# Patient Record
Sex: FEMALE | Race: AMERICAN INDIAN OR ALASKA NATIVE | HISPANIC OR LATINO | Employment: FULL TIME | URBAN - METROPOLITAN AREA
[De-identification: names, ages, dates, MRNs, and addresses within clinical notes are randomized per-mention and may not be internally consistent; named-entity substitution may affect disease eponyms.]

---

## 2023-01-27 NOTE — PROGRESS NOTES
[address] [facility]    NAME: Lana Jaramillo  AGE: 36 y o  SEX: female  : 1980   DATE: 2023     Assessment and Plan:     Problem List Items Addressed This Visit        Digestive    Gastroesophageal reflux disease without esophagitis     Episodes of chest pain of burning and stabbing quality  Also reports  Unclear if pain is related to meals, but she does report epigastric abdominal pain and sour metallic taste in mouth  Karan's negative  - Advised food diary  - Pepcid PRN for 8 weeks  - Lipid panel pending, will consider EKG next visit  - f/u in 2 months         Relevant Medications    famotidine (PEPCID) 20 mg tablet   Other Visit Diagnoses     Annual physical exam    -  Primary    Relevant Orders    CBC and differential    Comprehensive metabolic panel    HEMOGLOBIN A1C W/ EAG ESTIMATION    TSH, 3rd generation with Free T4 reflex    Lipid Panel with Direct LDL reflex    Mammo screening bilateral w 3d & cad    Hepatitis C antibody    HIV 1/2 AG/AB W REFLEX [facility] and [facility] only    Encounter for immunization        Relevant Orders    influenza vaccine, quadrivalent, 0 5 mL, preservative-free, for adult and pediatric patients 6 mos+ (AFLURIA, FLUARIX, FLULAVAL, FLUZONE) (Completed)    TDAP VACCINE GREATER THAN OR EQUAL TO 8YO IM (Completed)    Encounter for screening mammogram for malignant neoplasm of breast        Relevant Orders    Mammo screening bilateral w 3d & cad        Counseling:  Alcohol/drug use: discussed moderation in alcohol intake, the recommendations for healthy alcohol use, and avoidance of illicit drug use  Dental Health: discussed importance of regular tooth brushing, flossing, and dental visits  Injury prevention: discussed safety/seat belts, safety helmets, smoke detectors, carbon dioxide detectors, and smoking near bedding or upholstery    Sexual health: discussed sexually transmitted diseases, partner selection, use of condoms, avoidance of unintended pregnancy, and contraceptive alternatives  · Exercise: the importance of regular exercise/physical activity was discussed  Recommend exercise 3-5 times per week for at least 30 minutes  · Immunizations and preventive care: screenings were discussed with patient today  Appropriate education was printed on patient's after visit summary  BMI Counseling: Body mass index is 30 79 kg/m²  The BMI is above normal  Nutrition recommendations include decreasing portion sizes, encouraging healthy choices of fruits and vegetables, decreasing fast food intake, consuming healthier snacks, limiting drinks that contain sugar, moderation in carbohydrate intake, increasing intake of lean protein, reducing intake of saturated and trans fat and reducing intake of cholesterol  Exercise recommendations include moderate physical activity 150 minutes/week  Rationale for BMI follow-up plan is due to patient being overweight or obese  Depression Screening and Follow-up Plan: Patient was screened for depression during today's encounter  They screened negative with a PHQ-2 score of 0  Return in about 8 weeks (around 3/27/2023) for Follow up chronic conditions  Chief Complaint:     Chief Complaint   Patient presents with   • Establish Care     Discuss dry mouth      History of Present Illness:     Adult Annual Physical   Patient here for a comprehensive physical exam  The patient reports problems - chest pain, metallic taste in mouth, dry mouth  Unclear if worsens with meals  Diet and Physical Activity  · Diet/Nutrition: consuming 3-5 servings of fruits/vegetables daily, adequate fiber intake and high carbohydrate diet  Recently decreased carbohydrate intake  · Exercise: Walking and 1-2 times a week on average        Depression Screening  PHQ-2/9 Depression Screening    Little interest or pleasure in doing things: 0 - not at all  Feeling down, depressed, or hopeless: 0 - not at all  Trouble falling or staying asleep, or sleeping too much: 0 - not at all  Feeling tired or having little energy: 2 - more than half the days  Poor appetite or overeatin - not at all  Feeling bad about yourself - or that you are a failure or have let yourself or your family down: 0 - not at all  Trouble concentrating on things, such as reading the newspaper or watching television: 0 - not at all  Moving or speaking so slowly that other people could have noticed  Or the opposite - being so fidgety or restless that you have been moving around a lot more than usual: 0 - not at all  Thoughts that you would be better off dead, or of hurting yourself in some way: 0 - not at all  PHQ-2 Score: 0  PHQ-2 Interpretation: Negative depression screen  PHQ-9 Score: 2   PHQ-9 Interpretation: No or Minimal depression        General Health  · Sleep: sleeps well and gets 7-8 hours of sleep on average  · Hearing: normal - bilateral   · Vision: most recent eye exam <1 year ago and wears glasses  · Dental: regular dental visits  /GYN Health  · Patient is: premenopausal  · Contraceptive method: none     Review of Systems:     Review of Systems   Constitutional: Negative for chills and fever  HENT: Negative for ear pain and sore throat  Eyes: Negative for pain and visual disturbance  Respiratory: Negative for cough and shortness of breath  Cardiovascular: Negative for palpitations  Chest pain: 1 episode of chest pain  Gastrointestinal: Negative for abdominal pain and vomiting  Genitourinary: Negative for dysuria and hematuria  Musculoskeletal: Positive for back pain  Skin: Negative for color change and rash  Neurological: Negative for seizures and syncope  Past Medical History:     History reviewed  No pertinent past medical history  Past Surgical History:     History reviewed  No pertinent surgical history     Social History:     Social History     Socioeconomic History   • Marital status: Single     Spouse name: None   • Number of children: None   • Years of education: None   • Highest education level: None   Occupational History   • None   Tobacco Use   • Smoking status: Never   • Smokeless tobacco: Never   Substance and Sexual Activity   • Alcohol use: Not Currently   • Drug use: Never   • Sexual activity: Yes     Partners: Male   Other Topics Concern   • None   Social History Narrative   • None     Social Determinants of Health     Financial Resource Strain: Not on file   Food Insecurity: Not on file   Transportation Needs: Not on file   Physical Activity: Not on file   Stress: Not on file   Social Connections: Not on file   Intimate Partner Violence: Not on file   Housing Stability: Not on file      Family History:     History reviewed  No pertinent family history  Current Medications:     Current Outpatient Medications   Medication Sig Dispense Refill   • famotidine (PEPCID) 20 mg tablet Take 1 tablet (20 mg total) by mouth 2 (two) times a day as needed for heartburn 30 tablet 1   • Calcium-Vitamin D 500-3  125 MG-MCG TABS Take by mouth     • Multiple Vitamin (Multi-Vitamin Daily) TABS Take by mouth       No current facility-administered medications for this visit  Allergies:     No Known Allergies   Physical Exam:     /53 (BP Location: Left arm, Patient Position: Sitting, Cuff Size: Standard)   Pulse 60   Temp (!) 96 3 °F (35 7 °C)   Resp 16   Ht 5' 4 96" (1 65 m)   Wt 83 8 kg (184 lb 12 8 oz)   LMP 01/28/2023 (Exact Date)   SpO2 100%   BMI 30 79 kg/m²     Physical Exam  Vitals and nursing note reviewed  Constitutional:       General: She is not in acute distress  Appearance: Normal appearance  She is well-developed  She is obese  HENT:      Head: Normocephalic and atraumatic  Right Ear: Tympanic membrane, ear canal and external ear normal  There is no impacted cerumen        Left Ear: Tympanic membrane, ear canal and external ear normal  There is no impacted cerumen  Nose: No congestion  Mouth/Throat:      Mouth: Mucous membranes are moist       Pharynx: Oropharynx is clear  Eyes:      Conjunctiva/sclera: Conjunctivae normal    Cardiovascular:      Rate and Rhythm: Normal rate and regular rhythm  Heart sounds: No murmur heard  Pulmonary:      Effort: Pulmonary effort is normal  No respiratory distress  Breath sounds: Normal breath sounds  Abdominal:      Palpations: Abdomen is soft  Tenderness: There is no abdominal tenderness  Musculoskeletal:      Cervical back: Neck supple  Right lower leg: No edema  Left lower leg: No edema  Skin:     General: Skin is warm and dry  Capillary Refill: Capillary refill takes 2 to 3 seconds  Neurological:      Mental Status: She is alert     Psychiatric:         Mood and Affect: Mood normal           Zaki Goodman MD  [address] [place] [address]

## 2023-01-30 ENCOUNTER — OFFICE VISIT (OUTPATIENT)
Dept: FAMILY MEDICINE CLINIC | Facility: CLINIC | Age: 43
End: 2023-01-30

## 2023-01-30 VITALS
HEART RATE: 60 BPM | HEIGHT: 65 IN | RESPIRATION RATE: 16 BRPM | DIASTOLIC BLOOD PRESSURE: 53 MMHG | BODY MASS INDEX: 30.79 KG/M2 | TEMPERATURE: 96.3 F | WEIGHT: 184.8 LBS | OXYGEN SATURATION: 100 % | SYSTOLIC BLOOD PRESSURE: 113 MMHG

## 2023-01-30 DIAGNOSIS — Z23 ENCOUNTER FOR IMMUNIZATION: ICD-10-CM

## 2023-01-30 DIAGNOSIS — Z12.31 ENCOUNTER FOR SCREENING MAMMOGRAM FOR MALIGNANT NEOPLASM OF BREAST: ICD-10-CM

## 2023-01-30 DIAGNOSIS — Z00.00 ANNUAL PHYSICAL EXAM: Primary | ICD-10-CM

## 2023-01-30 DIAGNOSIS — K21.9 GASTROESOPHAGEAL REFLUX DISEASE WITHOUT ESOPHAGITIS: ICD-10-CM

## 2023-01-30 RX ORDER — FAMOTIDINE 20 MG/1
20 TABLET, FILM COATED ORAL 2 TIMES DAILY PRN
Qty: 30 TABLET | Refills: 1 | Status: SHIPPED | OUTPATIENT
Start: 2023-01-30

## 2023-01-30 RX ORDER — MULTIVITAMIN
TABLET ORAL
COMMUNITY

## 2023-01-31 PROBLEM — K21.9 GASTROESOPHAGEAL REFLUX DISEASE WITHOUT ESOPHAGITIS: Status: ACTIVE | Noted: 2023-01-31

## 2023-01-31 NOTE — ASSESSMENT & PLAN NOTE
Episodes of chest pain of burning and stabbing quality  Also reports  Unclear if pain is related to meals, but she does report epigastric abdominal pain and sour metallic taste in mouth  Karan's negative    - Advised food diary  - Pepcid PRN for 8 weeks  - Lipid panel pending, will consider EKG next visit  - f/u in 2 months

## 2023-02-03 LAB
ALBUMIN SERPL-MCNC: 4.1 G/DL (ref 3.8–4.8)
ALBUMIN/GLOB SERPL: 1.6 {RATIO} (ref 1.2–2.2)
ALP SERPL-CCNC: 86 IU/L (ref 44–121)
ALT SERPL-CCNC: 22 IU/L (ref 0–32)
AST SERPL-CCNC: 22 IU/L (ref 0–40)
BASOPHILS # BLD AUTO: 0.1 X10E3/UL (ref 0–0.2)
BASOPHILS NFR BLD AUTO: 1 %
BILIRUB SERPL-MCNC: 0.5 MG/DL (ref 0–1.2)
BUN SERPL-MCNC: 13 MG/DL (ref 6–24)
BUN/CREAT SERPL: 22 (ref 9–23)
CALCIUM SERPL-MCNC: 9.2 MG/DL (ref 8.7–10.2)
CHLORIDE SERPL-SCNC: 103 MMOL/L (ref 96–106)
CHOLEST SERPL-MCNC: 181 MG/DL (ref 100–199)
CO2 SERPL-SCNC: 23 MMOL/L (ref 20–29)
CREAT SERPL-MCNC: 0.59 MG/DL (ref 0.57–1)
EGFR: 115 ML/MIN/1.73
EOSINOPHIL # BLD AUTO: 0.1 X10E3/UL (ref 0–0.4)
EOSINOPHIL NFR BLD AUTO: 3 %
ERYTHROCYTE [DISTWIDTH] IN BLOOD BY AUTOMATED COUNT: 14.2 % (ref 11.7–15.4)
EST. AVERAGE GLUCOSE BLD GHB EST-MCNC: 108 MG/DL
GLOBULIN SER-MCNC: 2.5 G/DL (ref 1.5–4.5)
GLUCOSE SERPL-MCNC: 83 MG/DL (ref 70–99)
HBA1C MFR BLD: 5.4 % (ref 4.8–5.6)
HCT VFR BLD AUTO: 33.8 % (ref 34–46.6)
HDLC SERPL-MCNC: 52 MG/DL
HGB BLD-MCNC: 10.9 G/DL (ref 11.1–15.9)
IMM GRANULOCYTES # BLD: 0 X10E3/UL (ref 0–0.1)
IMM GRANULOCYTES NFR BLD: 0 %
LDLC SERPL CALC-MCNC: 112 MG/DL (ref 0–99)
LYMPHOCYTES # BLD AUTO: 2 X10E3/UL (ref 0.7–3.1)
LYMPHOCYTES NFR BLD AUTO: 40 %
MCH RBC QN AUTO: 25.7 PG (ref 26.6–33)
MCHC RBC AUTO-ENTMCNC: 32.2 G/DL (ref 31.5–35.7)
MCV RBC AUTO: 80 FL (ref 79–97)
MONOCYTES # BLD AUTO: 0.5 X10E3/UL (ref 0.1–0.9)
MONOCYTES NFR BLD AUTO: 10 %
NEUTROPHILS # BLD AUTO: 2.4 X10E3/UL (ref 1.4–7)
NEUTROPHILS NFR BLD AUTO: 46 %
PLATELET # BLD AUTO: 256 X10E3/UL (ref 150–450)
POTASSIUM SERPL-SCNC: 4.2 MMOL/L (ref 3.5–5.2)
PROT SERPL-MCNC: 6.6 G/DL (ref 6–8.5)
RBC # BLD AUTO: 4.24 X10E6/UL (ref 3.77–5.28)
SODIUM SERPL-SCNC: 139 MMOL/L (ref 134–144)
TRIGL SERPL-MCNC: 91 MG/DL (ref 0–149)
TSH SERPL DL<=0.005 MIU/L-ACNC: 2.61 UIU/ML (ref 0.45–4.5)
WBC # BLD AUTO: 5.2 X10E3/UL (ref 3.4–10.8)

## 2023-03-30 ENCOUNTER — RA CDI HCC (OUTPATIENT)
Dept: OTHER | Facility: HOSPITAL | Age: 43
End: 2023-03-30

## 2023-03-30 NOTE — PROGRESS NOTES
[facility]  coding opportunities       Chart reviewed, no opportunity found: CHART REVIEWED, NO OPPORTUNITY FOUND        Patients Insurance        Commercial Insurance: Mario Moore

## 2023-04-30 LAB — HCV AB S/CO SERPL IA: NON REACTIVE

## 2023-06-26 ENCOUNTER — OFFICE VISIT (OUTPATIENT)
Dept: FAMILY MEDICINE CLINIC | Facility: CLINIC | Age: 43
End: 2023-06-26
Payer: COMMERCIAL

## 2023-06-26 VITALS
SYSTOLIC BLOOD PRESSURE: 106 MMHG | TEMPERATURE: 98.2 F | BODY MASS INDEX: 31.06 KG/M2 | HEART RATE: 78 BPM | RESPIRATION RATE: 16 BRPM | WEIGHT: 186.4 LBS | OXYGEN SATURATION: 97 % | DIASTOLIC BLOOD PRESSURE: 54 MMHG | HEIGHT: 65 IN

## 2023-06-26 DIAGNOSIS — D64.9 ANEMIA, UNSPECIFIED TYPE: ICD-10-CM

## 2023-06-26 DIAGNOSIS — K21.9 GASTROESOPHAGEAL REFLUX DISEASE WITHOUT ESOPHAGITIS: Primary | ICD-10-CM

## 2023-06-26 DIAGNOSIS — G62.9 NEUROPATHY: ICD-10-CM

## 2023-06-26 DIAGNOSIS — M25.50 POLYARTHRALGIA: ICD-10-CM

## 2023-06-26 PROCEDURE — 99214 OFFICE O/P EST MOD 30 MIN: CPT | Performed by: FAMILY MEDICINE

## 2023-06-26 RX ORDER — PANTOPRAZOLE SODIUM 40 MG/1
40 TABLET, DELAYED RELEASE ORAL DAILY
Qty: 90 TABLET | Refills: 0 | Status: SHIPPED | OUTPATIENT
Start: 2023-06-26

## 2023-06-26 NOTE — PATIENT INSTRUCTIONS
Dieta mediterránea   CUIDADO AMBULATORIO:   Lisa Donning mediterránea es un plan de comidas que incluye alimentos que se consumen comúnmente en los países que bordean el [facility]  Selina plan de comidas puede proporcionar varios beneficios para [employer]  Se trata de perder o mantener el peso y disminuyendo la presión arterial, el azúcar en la eric y los niveles de [place]  También puede ayudar a proteger contra ciertas afecciones tales júnior enfermedad cardíaca, cáncer, diabetes de tipo 2 y enfermedad de Alzheimer  Colabore con un dietista para desarrollar un plan de alimentación que sea adecuado para usted  Alimentos para incluir en la dieta mediterránea: Sherleen Brittany y verduras en cada comida  Consuma norman variedad de frutas frescas y vegetales  Elija cereales integrales todos los días  Estos alimentos incluyen panes integrales, pastas y cereales  También incluye arroz integral, quinoa y mijo  Utilice las grasas insaturadas en lugar de grasas saturadas  Cocine con aceite de tsai o de canola  Limite el consumo de grasas saturadas, júnior [place], Erick y [facility]  Las grasas saturadas son grasas poco saludables que pueden aumentar asad niveles de [place]  Elija alimentos vegetales, aves y pescado júnior bean principales de proteínas  Coma alimentos vegetales que proporcionan proteínas, júnior lentejas, frijoles, garbanzos, nueces y semillas  Elija principalmente alimentos vegetales en lugar de carne en la mayoría de los días de la [place]  Coma alimentos ricos en grasas omega-3  Los pescados ricos en grasas omega-3 son el salmón, la [place] y el atún  Incluya estos tipos de pescado 1 o 2 veces por semana  Límite el pescado alto en fer, júnior el tiburón, el pez [place], el [place] y la caballa  Grasas omega-3 también se encuentran en las nueces y la linaza  Jc Stake de velazco (Grace Jose Luis) sin piel en lugar de donnell Luna  La carne juan m es shavonne en grasas saturadas  Limite el consumo de huevos y de amalia ricas en grasa, tales júnior tocino, chorizo y salchichas  Elija productos lácteos bajos en grasa tales júnior [address], o [place] al 1%, o leches de [place], de anacardo o de soja bajas en grasa  Otros ejemplos [address], yogur y requesón con un bajo contenido de grasa  Limite los dulces  Limite la ingesta de alimentos ricos en azúcar, júnior refrescos, postres y golosinas  Consulte con gavin médico sobre el alcohol  Los estudios gamez demostrado que la ingesta moderada de vino puede reducir el riesgo de enfermedades del corazón  Norman cantidad moderada de vino es 1 porción para las mujeres y los hombres de 72 años cada día  Se recomiendan dos porciones para hombres de 21 a 59 años de edad cada día  Norman porción de vino es de 5 onzas  Otras cosas que debe saber si sigue la dieta mediterránea:  Consuma alimentos con un alto contenido de joe y de vitamina C  Los alimentos vegetales ricos en joe incluyen espinaca, habas, tofu y alcachofa  Consuma norman porción de vitamina C con un alimento rico en joe para ayudar a gavin cuerpo a absorber más joe  Los ejemplos incluyen naranjas, fresas, melón, brócoli y [employer]  Realice actividad física regularmente  La dieta mediterránea tendrá [employer] beneficios si realiza actividad física regularmente  Qasim 30 minutos de [place] al menos 5 eusebio a la semana  Elija actividades físicas que aumentan la frecuencia cardíaca  Los ejemplos incluyen caminar, hacer senderismo, nadar y andar en bicicleta  Pregunte a gavin médico acerca del mejor plan de ejercicio para usted  © Copyright Shadi [facility] 2022 Information is for End User's use only and may not be sold, redistributed or otherwise used for commercial purposes  Esta información es sólo para uso en educación  Gavin intención no es darle un consejo médico sobre enfermedades o tratamientos   Colsulte con gavin médico, enfermera o farmacéutico antes de seguir cualquier régimen médico para saber si es seguro y efectivo para usted

## 2023-06-26 NOTE — PROGRESS NOTES
Name: Amada Reyes      : 1980      MRN: [id number]  Encounter Provider: Andrei Morse MD  Encounter Date: 2023   Encounter department: [facility]     1  Gastroesophageal reflux disease without esophagitis  Assessment & Plan:  Patient's episodes of chest discomfort and halitosis improved with Pepcid  Symptoms returned once patient discontinued the medication  · Discussed starting a PPI and patient was agreeable  · We will trial the medication for 8 to 12 weeks, if symptoms recur after regimen with Protonix will test for H  pylori  Orders:  -     pantoprazole (PROTONIX) 40 mg tablet; Take 1 tablet (40 mg total) by mouth daily    2  Polyarthralgia  Assessment & Plan:  Knees, wrists and shoulders affected  She also reports neuropathic type pain  · Follow up labs      Orders:  -     Iron Panel (Includes Ferritin, Iron Sat%, Iron, and TIBC); Future  -     Vitamin B12; Future  -     Sedimentation rate, automated; Future  -     PINKY Screen w/ Reflex to Titer/Pattern; Future    3  Neuropathy  Comments:  Hand and stocking distribution, A1c negative, not on any medications that could cause this  Follow-up vitamin panel and inflammatory markers  Orders:  -     Iron Panel (Includes Ferritin, Iron Sat%, Iron, and TIBC); Future  -     Vitamin B12; Future  -     Sedimentation rate, automated; Future  -     PINKY Screen w/ Reflex to Titer/Pattern; Future    4  Anemia, unspecified type  Comments:  Further work-up will determine need for supplementation  We will follow-up  Orders:  -     Iron Panel (Includes Ferritin, Iron Sat%, Iron, and TIBC); Future            Subjective      HPI     Patient presenting for f/u GERD and to discuss lab results  Was taking Pepcid as prescribed and stopped taking the medication because she felt better from her symptoms  After stopping the medication her reflux returned   She also has halitosis which is very frustrating for "her     She is also reporting joint pain and burning feeling in her hands and feet  It is accompanied by numbness, and worse in the upper extremities  All labs were discussed, including finding of low hemoglobin  Review of Systems   Constitutional: Negative for chills and fever  Eyes: Negative for visual disturbance  Respiratory: Negative for cough and shortness of breath  Cardiovascular: Negative for chest pain and palpitations  Gastrointestinal: Negative for constipation, diarrhea and vomiting  Abdominal pain: Epigastric discomfort  Genitourinary: Negative for dysuria and hematuria  Musculoskeletal: Positive for arthralgias and myalgias  Negative for back pain  Skin: Negative for rash  Neurological: Negative for seizures and syncope  Current Outpatient Medications on File Prior to Visit   Medication Sig   • Calcium-Vitamin D 500-3  125 MG-MCG TABS Take by mouth   • Multiple Vitamin (Multi-Vitamin Daily) TABS Take by mouth       Objective     /54   Pulse 78   Temp 98 2 °F (36 8 °C)   Resp 16   Ht 5' 5\" (1 651 m)   Wt 84 6 kg (186 lb 6 4 oz)   SpO2 97%   BMI 31 02 kg/m²     Physical Exam  Vitals reviewed  Constitutional:       General: She is not in acute distress  Appearance: Normal appearance  She is obese  Cardiovascular:      Rate and Rhythm: Normal rate and regular rhythm  Heart sounds: No murmur heard  Pulmonary:      Effort: Pulmonary effort is normal  No respiratory distress  Breath sounds: Normal breath sounds  Abdominal:      General: Bowel sounds are normal       Palpations: Abdomen is soft  Tenderness: There is no abdominal tenderness  Musculoskeletal:         General: No swelling or deformity  Cervical back: Neck supple  Neurological:      Mental Status: She is alert            Nereida Dumont MD  "

## 2023-06-29 PROBLEM — M25.50 POLYARTHRALGIA: Status: ACTIVE | Noted: 2023-06-29

## 2023-06-29 NOTE — ASSESSMENT & PLAN NOTE
Patient's episodes of chest discomfort and halitosis improved with Pepcid  Symptoms returned once patient discontinued the medication  · Discussed starting a PPI and patient was agreeable  · We will trial the medication for 8 to 12 weeks, if symptoms recur after regimen with Protonix will test for H  pylori

## 2023-07-15 LAB
ANA TITR SER IF: NEGATIVE {TITER}
ERYTHROCYTE [SEDIMENTATION RATE] IN BLOOD BY WESTERGREN METHOD: 17 MM/HR (ref 0–32)
FERRITIN SERPL-MCNC: 6 NG/ML (ref 15–150)
IRON SATN MFR SERPL: 8 % (ref 15–55)
IRON SERPL-MCNC: 36 UG/DL (ref 27–159)
TIBC SERPL-MCNC: 433 UG/DL (ref 250–450)
UIBC SERPL-MCNC: 397 UG/DL (ref 131–425)
VIT B12 SERPL-MCNC: 472 PG/ML (ref 232–1245)

## 2023-07-19 ENCOUNTER — TELEPHONE (OUTPATIENT)
Dept: FAMILY MEDICINE CLINIC | Facility: CLINIC | Age: 43
End: 2023-07-19

## 2023-07-19 NOTE — TELEPHONE ENCOUNTER
vm on [place] appt line:     Buenas tardes, mi nombre es Cristina Cease y yo tengo un appointment para Wilmer Montero. 20 a las 10:40 H para mi hijo [address] Ashish. Lo tengo que cancelar porque. Tengo. ¿¿Que trabajas entonces? . Tori Been pedirle que si me pueden hacer otra [place]. Para la semana que viene estaría [address], [place].

## 2023-07-20 NOTE — TELEPHONE ENCOUNTER
If patient call please staff let her know or transfer to me that she has and apt for her self on 7/25/2023 at 3pm thanks

## 2023-07-25 ENCOUNTER — OFFICE VISIT (OUTPATIENT)
Dept: FAMILY MEDICINE CLINIC | Facility: CLINIC | Age: 43
End: 2023-07-25
Payer: COMMERCIAL

## 2023-07-25 VITALS
OXYGEN SATURATION: 98 % | WEIGHT: 188 LBS | HEART RATE: 76 BPM | RESPIRATION RATE: 18 BRPM | DIASTOLIC BLOOD PRESSURE: 53 MMHG | BODY MASS INDEX: 31.28 KG/M2 | SYSTOLIC BLOOD PRESSURE: 112 MMHG

## 2023-07-25 DIAGNOSIS — G62.9 NEUROPATHY: ICD-10-CM

## 2023-07-25 DIAGNOSIS — D50.9 IRON DEFICIENCY ANEMIA, UNSPECIFIED IRON DEFICIENCY ANEMIA TYPE: Primary | ICD-10-CM

## 2023-07-25 PROCEDURE — 99213 OFFICE O/P EST LOW 20 MIN: CPT | Performed by: FAMILY MEDICINE

## 2023-07-25 RX ORDER — ASCORBIC ACID 500 MG
500 TABLET ORAL DAILY
Qty: 90 TABLET | Refills: 1 | Status: SHIPPED | OUTPATIENT
Start: 2023-07-25

## 2023-07-25 RX ORDER — GABAPENTIN 100 MG/1
100 CAPSULE ORAL
Qty: 30 CAPSULE | Refills: 2 | Status: SHIPPED | OUTPATIENT
Start: 2023-07-25

## 2023-07-25 RX ORDER — FERROUS SULFATE TAB EC 324 MG (65 MG FE EQUIVALENT) 324 (65 FE) MG
324 TABLET DELAYED RESPONSE ORAL
Qty: 90 TABLET | Refills: 3 | Status: SHIPPED | OUTPATIENT
Start: 2023-07-25

## 2023-07-30 PROBLEM — D50.9 IRON DEFICIENCY ANEMIA: Status: ACTIVE | Noted: 2023-07-30

## 2023-07-30 PROBLEM — G62.9 NEUROPATHY: Status: ACTIVE | Noted: 2023-07-30

## 2023-07-30 NOTE — PROGRESS NOTES
Name: Dallin Cuellar      : 1980      MRN: [id number]  Encounter Provider: Magnolia Ruff MD  Encounter Date: 2023   Encounter department: [address]     1. Iron deficiency anemia, unspecified iron deficiency anemia type  Assessment & Plan:  Work-up consistent with KATE. Iron supplementation and vitamin C prescribed. We will continue to monitor patient's anemia. Orders:  -     ferrous sulfate 324 (65 Fe) mg; Take 1 tablet (324 mg total) by mouth daily before breakfast  -     ascorbic acid (VITAMIN C) 500 MG tablet; Take 1 tablet (500 mg total) by mouth daily    2. Neuropathy  Assessment & Plan:  Patient with bilateral neuropathy affecting palms and soles. Initial work-up did not reveal any possible causes. Pain has been disturbing patient's sleep  · At this point, neurology referral is appropriate  · We will prescribe low-dose gabapentin to help assist with symptoms until neurology appointment    Orders:  -     gabapentin (Neurontin) 100 mg capsule; Take 1 capsule (100 mg total) by mouth daily at bedtime  -     Ambulatory Referral to Neurology; Future         Subjective      HPI     Marylen Snow is presenting to discuss lab results. Patient was being worked up for anemia and neuropathic symptoms. She describes symptoms of burning on palms and soles bilaterally. It is accompanied by numbness, and worse in the upper extremities. Symptoms occur all day and are worse at night. Symptoms are symmetric. She denies orthostasis, nausea, alternating diarrhea and constipation, dry eyes and dry mouth and blurred vision. No previous treatment. Review of Systems   Constitutional: Negative for chills and fever. Respiratory: Negative for cough and shortness of breath. Cardiovascular: Negative for chest pain. Gastrointestinal: Negative for abdominal pain. Skin: Negative for color change and rash. Neurological: Negative for syncope and weakness.        Current Outpatient Medications on File Prior to Visit   Medication Sig   • Calcium-Vitamin D 500-3. 125 MG-MCG TABS Take by mouth   • Multiple Vitamin (Multi-Vitamin Daily) TABS Take by mouth   • pantoprazole (PROTONIX) 40 mg tablet Take 1 tablet (40 mg total) by mouth daily       Objective     /53   Pulse 76   Resp 18   Wt 85.3 kg (188 lb)   SpO2 98%   BMI 31.28 kg/m²     Physical Exam  Vitals reviewed. Constitutional:       General: She is not in acute distress. Appearance: Normal appearance. She is obese. Cardiovascular:      Rate and Rhythm: Normal rate and regular rhythm. Heart sounds: No murmur heard. Pulmonary:      Effort: Pulmonary effort is normal. No respiratory distress. Breath sounds: Normal breath sounds. Abdominal:      General: Bowel sounds are normal.      Palpations: Abdomen is soft. Tenderness: There is no abdominal tenderness. Musculoskeletal:         General: No swelling or deformity. Right hand: Normal range of motion. Normal sensation. Normal capillary refill. Normal pulse. Left hand: Normal range of motion. Normal sensation. Normal capillary refill. Normal pulse. Cervical back: Neck supple. Right foot: Normal range of motion and normal capillary refill. Normal pulse. Left foot: Normal range of motion and normal capillary refill. Normal pulse. Comments: Microfilament testing on bilateral hands and feet wnl   Neurological:      Mental Status: She is alert.        Loi Jack MD

## 2023-09-24 DIAGNOSIS — K21.9 GASTROESOPHAGEAL REFLUX DISEASE WITHOUT ESOPHAGITIS: ICD-10-CM

## 2023-09-25 RX ORDER — PANTOPRAZOLE SODIUM 40 MG/1
40 TABLET, DELAYED RELEASE ORAL DAILY
Qty: 90 TABLET | Refills: 0 | Status: SHIPPED | OUTPATIENT
Start: 2023-09-25

## 2023-12-24 DIAGNOSIS — K21.9 GASTROESOPHAGEAL REFLUX DISEASE WITHOUT ESOPHAGITIS: ICD-10-CM

## 2023-12-26 RX ORDER — PANTOPRAZOLE SODIUM 40 MG/1
40 TABLET, DELAYED RELEASE ORAL DAILY
Qty: 90 TABLET | Refills: 0 | Status: SHIPPED | OUTPATIENT
Start: 2023-12-26

## 2024-03-28 ENCOUNTER — OFFICE VISIT (OUTPATIENT)
Age: 44
End: 2024-03-28

## 2024-03-28 VITALS
DIASTOLIC BLOOD PRESSURE: 60 MMHG | SYSTOLIC BLOOD PRESSURE: 96 MMHG | HEART RATE: 76 BPM | TEMPERATURE: 97.9 F | HEIGHT: 65 IN | WEIGHT: 189 LBS | OXYGEN SATURATION: 97 % | BODY MASS INDEX: 31.49 KG/M2 | RESPIRATION RATE: 18 BRPM

## 2024-03-28 DIAGNOSIS — Z00.00 ANNUAL PHYSICAL EXAM: Primary | ICD-10-CM

## 2024-03-28 DIAGNOSIS — R30.0 DYSURIA: ICD-10-CM

## 2024-03-28 DIAGNOSIS — R19.6 HALITOSIS: ICD-10-CM

## 2024-03-28 DIAGNOSIS — D50.9 IRON DEFICIENCY ANEMIA, UNSPECIFIED IRON DEFICIENCY ANEMIA TYPE: ICD-10-CM

## 2024-03-28 DIAGNOSIS — K21.9 GASTROESOPHAGEAL REFLUX DISEASE WITHOUT ESOPHAGITIS: ICD-10-CM

## 2024-03-28 DIAGNOSIS — Z12.31 ENCOUNTER FOR SCREENING MAMMOGRAM FOR BREAST CANCER: ICD-10-CM

## 2024-03-28 PROCEDURE — 99396 PREV VISIT EST AGE 40-64: CPT | Performed by: FAMILY MEDICINE

## 2024-03-28 RX ORDER — CHLORHEXIDINE GLUCONATE ORAL RINSE 1.2 MG/ML
15 SOLUTION DENTAL DAILY
Qty: 105 ML | Refills: 0 | Status: SHIPPED | OUTPATIENT
Start: 2024-03-28 | End: 2024-04-04

## 2024-03-28 NOTE — ASSESSMENT & PLAN NOTE
Send labs to recheck levels.  Advised patient to continue iron supplementation.  GI referral sent to rule out GI bleed as cause of anemia.

## 2024-03-28 NOTE — ASSESSMENT & PLAN NOTE
Patient with sensation of a bulge in the vagina during activity, also reported a burning sensation.  Patient has had 3 vaginal deliveries.  Consider pelvic organ prolapse  Will send UA to rule out urinary infection  RTO for GYN annual, will evaluate POP at that time

## 2024-03-28 NOTE — PROGRESS NOTES
ADULT ANNUAL PHYSICAL  [facility] PRACTICE    NAME: Sejal Venegas  AGE: 43 y.o. SEX: female : 1980   DATE: 3/28/2024     Assessment and Plan:     1. Annual physical exam  -     CBC and differential; Future  -     Comprehensive metabolic panel; Future  -     TSH, 3rd generation with Free T4 reflex; Future  -     Lipid panel; Future  -     CBC and differential  -     Comprehensive metabolic panel  -     TSH, 3rd generation with Free T4 reflex  -     Lipid panel    2. Encounter for screening mammogram for breast cancer  -     Mammo screening bilateral w 3d & cad; Future; Expected date: 2024    3. Gastroesophageal reflux disease without esophagitis  Assessment & Plan:  GERD symptoms improved with Protonix, however they came back after medication stopped patient also experiencing dyspepsia symptoms despite PPI therapy.  Will check for H. pylori  Patient also has iron deficiency anemia, will send referral to GI due to persistent symptoms and anemia.    Patient 43 years old and almost due for colonoscopy    Orders:  -     H. pylori antigen, stool; Future  -     H. pylori antigen, stool  -     Ambulatory Referral to Gastroenterology; Future    4. Iron deficiency anemia, unspecified iron deficiency anemia type  Assessment & Plan:  Send labs to recheck levels.  Advised patient to continue iron supplementation.  GI referral sent to rule out GI bleed as cause of anemia.    Orders:  -     Ambulatory Referral to Gastroenterology; Future  -     Iron Panel (Includes Ferritin, Iron Sat%, Iron, and TIBC); Future    5. Dysuria  Assessment & Plan:  Patient with sensation of a bulge in the vagina during activity, also reported a burning sensation.  Patient has had 3 vaginal deliveries.  Consider pelvic organ prolapse  Will send UA to rule out urinary infection  RTO for GYN annual, will evaluate POP at that time    Orders:  -     UA (URINE) with reflex to  "Scope; Future  -     UA (URINE) with reflex to Scope    6. Halitosis  -     chlorhexidine (PERIDEX) 0.12 % solution; Apply 15 mL to the mouth or throat in the morning for 7 days      Counseling:  Alcohol/drug use: discussed moderation in alcohol intake, the recommendations for healthy alcohol use, and avoidance of illicit drug use.  Dental Health: discussed importance of regular tooth brushing, flossing, and dental visits.  Injury prevention: discussed safety/seat belts, safety helmets, smoke detectors, carbon dioxide detectors, and smoking near bedding or upholstery.  Sexual health: discussed sexually transmitted diseases, partner selection, use of condoms, avoidance of unintended pregnancy, and contraceptive alternatives.  Exercise: the importance of regular exercise/physical activity was discussed. Recommend exercise 3-5 times per week for at least 30 minutes.  Immunizations and preventive care screenings: Discussed with patient today. Appropriate education was printed on patient's after visit summary.    BMI Counseling: Body mass index is 31.45 kg/m². The BMI is above normal. Nutrition recommendations include encouraging healthy choices of fruits and vegetables and moderation in carbohydrate intake. Exercise recommendations include moderate physical activity 150 minutes/week. Rationale for BMI follow-up plan is due to patient being overweight or obese.     Depression Screening and Follow-up Plan: Patient was screened for depression during today's encounter. They screened negative with a PHQ-2 score of 1.        No follow-ups on file.     Chief Complaint:     Chief Complaint   Patient presents with    Physical Exam     Protonix is not helping with stomach issues      History of Present Illness:     Adult Annual Physical   Patient here for a comprehensive physical exam. The patient reports problems - abdominal pain that has not improved with PPIs. Also reports \"bulge from vagina\" and a burning sensation that she " notices when she is doing household chores.  .    Diet and Physical Activity  Diet/Nutrition: well balanced diet.   Exercise: walking.      Screening Depression/Anxiety:  PHQ-2/9 Depression Screening    Little interest or pleasure in doing things: 0 - not at all  Feeling down, depressed, or hopeless: 1 - several days  Trouble falling or staying asleep, or sleeping too much: 1 - several days  Feeling tired or having little energy: 3 - nearly every day  Poor appetite or overeatin - more than half the days  Feeling bad about yourself - or that you are a failure or have let yourself or your family down: 0 - not at all  Trouble concentrating on things, such as reading the newspaper or watching television: 0 - not at all  Moving or speaking so slowly that other people could have noticed. Or the opposite - being so fidgety or restless that you have been moving around a lot more than usual: 2 - more than half the days  Thoughts that you would be better off dead, or of hurting yourself in some way: 0 - not at all  PHQ-2 Score: 1  PHQ-2 Interpretation: Negative depression screen  PHQ-9 Score: 9  PHQ-9 Interpretation: Mild depression         JOLANTA-7 Flowsheet Screening      Flowsheet Row Most Recent Value   Over the last 2 weeks, how often have you been bothered by any of the following problems?    Feeling nervous, anxious, or on edge 1   Not being able to stop or control worrying 3   Worrying too much about different things 3   Trouble relaxing 0   Being so restless that it is hard to sit still 1   Becoming easily annoyed or irritable 2   Feeling afraid as if something awful might happen 3   JOLANTA-7 Total Score 13             General Health  Sleep: sleeps well.   Hearing: normal - bilateral.  Vision: no vision problems.   Dental: regular dental visits.       /GYN Health  Patient is: premenopausal  Periods are regular, no issues.      Review of Systems:     Review of Systems   Constitutional:  Negative for chills and fever.    Respiratory:  Negative for cough and shortness of breath.    Cardiovascular:  Negative for chest pain.   Gastrointestinal:  Positive for abdominal pain.   Genitourinary:  Negative for difficulty urinating.   Skin:  Negative for rash.   Neurological:  Negative for headaches.      Past Medical History:     History reviewed. No pertinent past medical history.   Past Surgical History:     History reviewed. No pertinent surgical history.   Social History:     E-Cigarette/Vaping    E-Cigarette Use Never User      E-Cigarette/Vaping Substances     Social History     Socioeconomic History    Marital status: Single     Spouse name: None    Number of children: None    Years of education: None    Highest education level: None   Occupational History    None   Tobacco Use    Smoking status: Never    Smokeless tobacco: Never   Vaping Use    Vaping status: Never Used   Substance and Sexual Activity    Alcohol use: Not Currently    Drug use: Never    Sexual activity: Yes     Partners: Male   Other Topics Concern    None   Social History Narrative    None     Social Determinants of Health     Financial Resource Strain: Low Risk  (3/28/2024)    Overall Financial Resource Strain (CARDIA)     Difficulty of Paying Living Expenses: Not hard at all   Food Insecurity: No Food Insecurity (3/28/2024)    Hunger Vital Sign     Worried About Running Out of Food in the Last Year: Never true     Ran Out of Food in the Last Year: Never true   Transportation Needs: No Transportation Needs (3/28/2024)    PRAPARE - Transportation     Lack of Transportation (Medical): No     Lack of Transportation (Non-Medical): No   Physical Activity: Not on file   Stress: Not on file   Social Connections: Not on file   Intimate Partner Violence: Not on file   Housing Stability: Low Risk  (3/28/2024)    Housing Stability Vital Sign     Unable to Pay for Housing in the Last Year: No     Number of Places Lived in the Last Year: 1     Unstable Housing in the Last  "Year: No      Family History:     History reviewed. No pertinent family history.   Current Medications:     Current Outpatient Medications   Medication Sig Dispense Refill    ascorbic acid (VITAMIN C) 500 MG tablet Take 1 tablet (500 mg total) by mouth daily 90 tablet 1    Calcium-Vitamin D 500-3.125 MG-MCG TABS Take by mouth      chlorhexidine (PERIDEX) 0.12 % solution Apply 15 mL to the mouth or throat in the morning for 7 days 105 mL 0    ferrous sulfate 324 (65 Fe) mg Take 1 tablet (324 mg total) by mouth daily before breakfast 90 tablet 3    gabapentin (Neurontin) 100 mg capsule Take 1 capsule (100 mg total) by mouth daily at bedtime 30 capsule 2    Multiple Vitamin (Multi-Vitamin Daily) TABS Take by mouth      pantoprazole (PROTONIX) 40 mg tablet TAKE 1 TABLET BY MOUTH EVERY DAY 90 tablet 0     No current facility-administered medications for this visit.      Allergies:     No Known Allergies   Physical Exam:     BP 96/60 (BP Location: Right arm, Patient Position: Sitting, Cuff Size: Adult)   Pulse 76   Temp 97.9 °F (36.6 °C) (Tympanic)   Resp 18   Ht 5' 5\" (1.651 m)   Wt 85.7 kg (189 lb)   SpO2 97%   BMI 31.45 kg/m²     Physical Exam  Constitutional:       General: She is not in acute distress.     Appearance: She is normal weight.   HENT:      Head: Normocephalic and atraumatic.      Right Ear: Tympanic membrane, ear canal and external ear normal.      Left Ear: Tympanic membrane, ear canal and external ear normal.      Nose: No congestion.      Mouth/Throat:      Mouth: Mucous membranes are moist.      Pharynx: Oropharynx is clear.   Eyes:      General: No scleral icterus.     Extraocular Movements: Extraocular movements intact.      Pupils: Pupils are equal, round, and reactive to light.   Cardiovascular:      Rate and Rhythm: Normal rate and regular rhythm.      Heart sounds: Normal heart sounds. No murmur heard.  Pulmonary:      Effort: Pulmonary effort is normal. No respiratory distress.      " Breath sounds: Normal breath sounds.   Abdominal:      General: Bowel sounds are normal.      Palpations: Abdomen is soft.      Tenderness: There is abdominal tenderness (epigastric).   Musculoskeletal:      Cervical back: Neck supple.      Right lower leg: No edema.      Left lower leg: No edema.   Skin:     General: Skin is warm.      Capillary Refill: Capillary refill takes less than 2 seconds.      Coloration: Skin is not cyanotic.      Findings: No rash.   Neurological:      General: No focal deficit present.      Mental Status: She is alert and oriented to person, place, and time.   Psychiatric:         Mood and Affect: Mood normal.          Hue Sanchez MD  Family Medicine PGY-3  [facility]

## 2024-03-28 NOTE — ASSESSMENT & PLAN NOTE
GERD symptoms improved with Protonix, however they came back after medication stopped patient also experiencing dyspepsia symptoms despite PPI therapy.  Will check for H. pylori  Patient also has iron deficiency anemia, will send referral to GI due to persistent symptoms and anemia.    Patient 43 years old and almost due for colonoscopy

## 2024-04-09 LAB
ALBUMIN SERPL-MCNC: 4 G/DL (ref 3.9–4.9)
ALBUMIN/GLOB SERPL: 1.6 {RATIO} (ref 1.2–2.2)
ALP SERPL-CCNC: 94 IU/L (ref 44–121)
ALT SERPL-CCNC: 24 IU/L (ref 0–32)
APPEARANCE UR: CLEAR
AST SERPL-CCNC: 19 IU/L (ref 0–40)
BASOPHILS # BLD AUTO: 0 X10E3/UL (ref 0–0.2)
BASOPHILS NFR BLD AUTO: 0 %
BILIRUB SERPL-MCNC: 0.5 MG/DL (ref 0–1.2)
BILIRUB UR QL STRIP: NEGATIVE
BUN SERPL-MCNC: 11 MG/DL (ref 6–24)
BUN/CREAT SERPL: 20 (ref 9–23)
CALCIUM SERPL-MCNC: 9 MG/DL (ref 8.7–10.2)
CHLORIDE SERPL-SCNC: 104 MMOL/L (ref 96–106)
CHOLEST SERPL-MCNC: 158 MG/DL (ref 100–199)
CHOLEST/HDLC SERPL: 3.4 RATIO (ref 0–4.4)
CO2 SERPL-SCNC: 24 MMOL/L (ref 20–29)
COLOR UR: YELLOW
CREAT SERPL-MCNC: 0.56 MG/DL (ref 0.57–1)
EGFR: 115 ML/MIN/1.73
EOSINOPHIL # BLD AUTO: 0.2 X10E3/UL (ref 0–0.4)
EOSINOPHIL NFR BLD AUTO: 2 %
ERYTHROCYTE [DISTWIDTH] IN BLOOD BY AUTOMATED COUNT: 13.5 % (ref 11.7–15.4)
GLOBULIN SER-MCNC: 2.5 G/DL (ref 1.5–4.5)
GLUCOSE SERPL-MCNC: 85 MG/DL (ref 70–99)
GLUCOSE UR QL: NEGATIVE
H PYLORI AG STL QL IA: POSITIVE
HCT VFR BLD AUTO: 38.8 % (ref 34–46.6)
HDLC SERPL-MCNC: 47 MG/DL
HGB BLD-MCNC: 12.4 G/DL (ref 11.1–15.9)
HGB UR QL STRIP: NEGATIVE
IMM GRANULOCYTES # BLD: 0 X10E3/UL (ref 0–0.1)
IMM GRANULOCYTES NFR BLD: 0 %
KETONES UR QL STRIP: NEGATIVE
LDLC SERPL CALC-MCNC: 88 MG/DL (ref 0–99)
LEUKOCYTE ESTERASE UR QL STRIP: NEGATIVE
LYMPHOCYTES # BLD AUTO: 2.3 X10E3/UL (ref 0.7–3.1)
LYMPHOCYTES NFR BLD AUTO: 28 %
MCH RBC QN AUTO: 28.4 PG (ref 26.6–33)
MCHC RBC AUTO-ENTMCNC: 32 G/DL (ref 31.5–35.7)
MCV RBC AUTO: 89 FL (ref 79–97)
MICRO URNS: NORMAL
MONOCYTES # BLD AUTO: 0.7 X10E3/UL (ref 0.1–0.9)
MONOCYTES NFR BLD AUTO: 8 %
NEUTROPHILS # BLD AUTO: 5.1 X10E3/UL (ref 1.4–7)
NEUTROPHILS NFR BLD AUTO: 62 %
NITRITE UR QL STRIP: NEGATIVE
PH UR STRIP: 6.5 [PH] (ref 5–7.5)
PLATELET # BLD AUTO: 229 X10E3/UL (ref 150–450)
POTASSIUM SERPL-SCNC: 4.4 MMOL/L (ref 3.5–5.2)
PROT SERPL-MCNC: 6.5 G/DL (ref 6–8.5)
PROT UR QL STRIP: NEGATIVE
RBC # BLD AUTO: 4.37 X10E6/UL (ref 3.77–5.28)
SL AMB VLDL CHOLESTEROL CALC: 23 MG/DL (ref 5–40)
SODIUM SERPL-SCNC: 139 MMOL/L (ref 134–144)
SP GR UR: 1.03 (ref 1–1.03)
TRIGL SERPL-MCNC: 132 MG/DL (ref 0–149)
TSH SERPL DL<=0.005 MIU/L-ACNC: 1.77 UIU/ML (ref 0.45–4.5)
UROBILINOGEN UR STRIP-ACNC: 0.2 MG/DL (ref 0.2–1)
WBC # BLD AUTO: 8.3 X10E3/UL (ref 3.4–10.8)

## 2024-04-11 ENCOUNTER — TELEPHONE (OUTPATIENT)
Age: 44
End: 2024-04-11

## 2024-04-11 DIAGNOSIS — A04.8 H. PYLORI INFECTION: Primary | ICD-10-CM

## 2024-04-11 RX ORDER — OMEPRAZOLE 20 MG/1
20 CAPSULE, DELAYED RELEASE ORAL DAILY
Qty: 14 CAPSULE | Refills: 0 | Status: SHIPPED | OUTPATIENT
Start: 2024-04-11 | End: 2024-04-25

## 2024-04-11 RX ORDER — CLARITHROMYCIN 500 MG/1
500 TABLET, COATED ORAL EVERY 12 HOURS SCHEDULED
Qty: 28 TABLET | Refills: 0 | Status: SHIPPED | OUTPATIENT
Start: 2024-04-11 | End: 2024-04-25

## 2024-04-11 RX ORDER — AMOXICILLIN 500 MG/1
1000 TABLET, FILM COATED ORAL 2 TIMES DAILY
Qty: 56 TABLET | Refills: 0 | Status: SHIPPED | OUTPATIENT
Start: 2024-04-11 | End: 2024-04-25

## 2024-04-11 NOTE — TELEPHONE ENCOUNTER
Spoke to patient about positive H. Pylori results, will send medications to pharmacy. She has follow up scheduled on 4/19.

## 2024-04-12 ENCOUNTER — RA CDI HCC (OUTPATIENT)
Dept: OTHER | Facility: HOSPITAL | Age: 44
End: 2024-04-12

## 2024-04-12 NOTE — PROGRESS NOTES
HCC coding opportunities       Chart reviewed, no opportunity found: CHART REVIEWED, NO OPPORTUNITY FOUND        Patients Insurance        Commercial Insurance: [email or web address]

## 2024-04-18 NOTE — PROGRESS NOTES
Assessment and Plan:    1. Encounter for annual routine gynecological examination  Assessment & Plan:  Normal GYN and breast exams today, emphasized the importance of annual GYN examination.  Pap with HPV not performed today, done in 2022 at [facility]. Results were normal  STD testing performed: No  Advised yearly mammograms. Mammogram scheduled for July 18th.  Colon cancer screening with a colonoscopy is recommended starting at age 45 and reviewed benefits. Rx not given  Counseling on weight loss: Encourage 30-40 min weight bearing exercise most days of week  Encourage at least 1200 mg calcium citrate + 2000 IUs vitamin D3 divided through diet and supplement throughout the day  I reviewed the patient’s risk factors for osteoporosis and ordered a DEXA scan if applicable  All questions have been answered to her satisfaction      2. Cystocele, unspecified  Assessment & Plan:  Discussed management options, patient would like to attempt conservative management at this time prior to pessaries or surgery.    Kegel exercises discussed, also sent referral for pelvic floor PT.    Orders:  -     Ambulatory Referral to Physical Therapy; Future    3. H. pylori infection  Assessment & Plan:  Patient is currently undergoing treatment for H. pylori, states her symptoms have much improved.  Sent test of cure to be performed 2 to 4 weeks after cessation of all H. pylori medications    Orders:  -     H. pylori breath test; Future; Expected date: 05/19/2024  -     H. pylori breath test        Subjective      Sejal Venegas is a 43 y.o. female who presents for annual GYN exam. Periods are irregular every month to every other month, lasting 3 days. Patient denies intermenstrual bleeding, spotting, or discharge.    Patient is reporting bulging sensation as well as urinary incontinence with sneezing, coughing and when doing chores around the home.  She is also having discomfort during intercourse.    GYN:   Denies vaginal discharge,  "labial erythema or lesions, dyspareunia.   Contraception: None.   Patient is sexually active with 1 partner.   Last pap smear was in . Results were normal.   Gynecologic surgeries- tubal ligation.    OB:     OB History   No obstetric history on file.       :   Denies dysuria, urinary frequency or urgency.   Denies hematuria, flank pain.  Reports incontinence    Breast:   Denies breast mass, discharge and overlying skin changes such as dimpling, reddening, nipple retraction.   Patient does perform self breast awareness.   Last mammogram was in .    Patient does have a family history of breast or ovarian ca.Mom with uterine CA         Review of Systems  As stated in HPI.     Objective      BP 95/60 (BP Location: Right arm, Patient Position: Sitting, Cuff Size: Standard)   Pulse 78   Temp 98.2 °F (36.8 °C) (Tympanic)   Ht 5' 5\" (1.651 m)   Wt 83.5 kg (184 lb)   LMP 03/10/2024   SpO2 99%   BMI 30.62 kg/m²     General:   alert and oriented, in no acute distress and cooperative   Heart: regular rate and rhythm, S1, S2 normal, no murmur, click, rub or gallop   Lungs: clear to auscultation bilaterally   Abdomen: soft, non-tender, without masses or organomegaly   Vulva: normal   Vagina: Cystocele   Cervix: no cervical motion tenderness   Uterus: normal size   Adnexa: normal adnexa            Hue Simon MD  Family Medicine PGY-3  "

## 2024-04-19 ENCOUNTER — ANNUAL EXAM (OUTPATIENT)
Age: 44
End: 2024-04-19

## 2024-04-19 VITALS
HEART RATE: 78 BPM | OXYGEN SATURATION: 99 % | TEMPERATURE: 98.2 F | HEIGHT: 65 IN | DIASTOLIC BLOOD PRESSURE: 60 MMHG | BODY MASS INDEX: 30.66 KG/M2 | SYSTOLIC BLOOD PRESSURE: 95 MMHG | WEIGHT: 184 LBS

## 2024-04-19 DIAGNOSIS — N81.10 CYSTOCELE, UNSPECIFIED: ICD-10-CM

## 2024-04-19 DIAGNOSIS — A04.8 H. PYLORI INFECTION: ICD-10-CM

## 2024-04-19 DIAGNOSIS — Z01.419 ENCOUNTER FOR ANNUAL ROUTINE GYNECOLOGICAL EXAMINATION: Primary | ICD-10-CM

## 2024-04-19 PROCEDURE — 99396 PREV VISIT EST AGE 40-64: CPT | Performed by: FAMILY MEDICINE

## 2024-04-19 NOTE — PATIENT INSTRUCTIONS
Cistocele   LO QUE NECESITA SABER:   El cistocele es norman condición en la cual parte de la vejiga baja o  dentro de la vagina debido a un debilitamiento o estiramiento de los músculos pélvicos. Es posible que la vejiga comience a sobresalir por la abertura de la vagina.       INSTRUCCIONES SOBRE EL ESTEVAN HOSPITALARIA:   Regrese a la graeme de emergencias si:  Tiene sangrado vaginal que no es gavin periodo menstrual.    Le sale norman masa abultada por la vagina que no puede empujar hacia dentro.    Usted tiene dolor intenso en la parte inferior del abdomen.    Tiene flujo vaginal con mal olor.    Llame a gavin médico o ginecólogo si:  Tiene fiebre.    Tiene escalofríos o se siente débil y dolorido.    Le duele la parte inferior del abdomen o de la espalda y el dolor no se le pasa.    Usted no puede orinar.    Tiene alguna pregunta acerca de gavin condición o cuidado.    Maneje o prevenga el cistocele:  Irina los ejercicios de Kegel con frecuencia. Estos ejercicios pueden ayudar a fortalecer los músculos del suelo pélvico. Apriete los músculos de gavin pelvis (los músculos que usa para dejar de orinar). Manténgalos apretados ina 5 segundos y luego relájelos ina 5 segundos. Poco a poco, trabaje en apretar los músculos por 10 segundos. Irina al menos 3 juegos de 10 repeticiones todos los días.    Evite los esfuerzos. No levante objetos pesados, permanezca de pie por mucho tiempo o irina esfuerzo al tener norman evacuación intestinal. Evite el estreñimiento bebiendo más líquidos y comiendo alimentos ricos en fibra. Consulte cuál es la cantidad de líquidos que usted debe jeannette por día. Entre los alimentos con un alto contenido de fibra se incluyen las frutas y verduras frescas y los granos integrales.    Mantenga un peso saludable. Consulte con gavin médico sobre el peso ideal para usted. Pídale que lo ayude a crear un plan para bajar de peso si tiene sobrepeso. También puede ayudarlo a crear un plan de ejercicio. El ejercicio ayuda a  que los intestinos funcionen mejor y a que disminuya la presión dentro del colon.         Mantenga un diario. Anote la cantidad de veces que orina a diario. Describa el color y la cantidad de orina. Traiga el registro con usted a saad citas de seguimiento.      Ejercicios de Kegel para mujeres   CUIDADO AMBULATORIO:   Los ejercicios de Kegel ayudan a fortalecer los músculos pélvicos. Los músculos pélvicos sostienen los órganos de la pelvis, júnior gavin vejiga y útero. Los ejercicios de Kegel ayudan a prevenir o controlar ciertas afecciones, júnior la incontinencia urinaria (pérdida de orina) o el prolapso uterino.       Llame a gavin médico o fisioterapeuta si:  No siente que los músculos se contraen o relajan.    Continúa con pérdidas de orina.    Usted tiene preguntas o inquietudes acerca de gavin condición o cuidado.    Uso correcto de los músculos: Los músculos pélvicos son los músculos que utiliza para controlar el flujo de orina. Para ubicar estos músculos, interrumpa y reinicie el flujo de orina varias veces. Usted se familiarizará con la sensación de contraer y relajar estos músculos.  Cómo realizar los ejercicios de Kegel:  Prepare gavin cuerpo en norman posición cómoda. Puede acostarse, pararse o sentarse para hacer estos ejercicios. Cuando intente hacer estos ejercicios por primera vez, aura vez sea más fácil si se acuesta.    Contraiga o tensione los músculos pélvicos lentamente. Usted seguramente sentirá júnior si estuviera aguantando la orina o gases. Mantenga esta posición por 3 segundos. Relaje por 3 segundos. Repita jose ciclo 10 veces. No contenga la respiración cuando qasim los ejercicios Kegel. Mantenga relajados los músculos del estómago, espalda y piernas.    Qasim 10 series de ejercicios Kegel, al menos 3 veces por día. Cuando sepa cómo hacer los ejercicios de Kegel, use diferentes posiciones. Fisher ayudará a fortalecer los músculos pélvicos tanto júnior sea posible. Puede hacer estos ejercicios mientras está recostada  en el suelo, viendo la televisión o de pie. Contraiga los músculos pélvicos antes de estornudar, toser o levantar algo para evitar un escape de orina. Es posible que note norman mejora en el control de la vejiga dentro de unas 6 semanas.    Acuda a saad consultas de control con gavin médico o fisioterapeuta según le indicaron: Anote saad preguntas para que se acuerde de hacerlas ina saad visitas.  © Copyright [employer] 2023 Information is for End User's use only and may not be sold, redistributed or otherwise used for commercial purposes.  Esta información es sólo para uso en educación. Gavin intención no es darle un consejo médico sobre enfermedades o tratamientos. Colsulte con gavin médico, enfermera o farmacéutico antes de seguir cualquier régimen médico para saber si es seguro y efectivo para usted.

## 2024-04-21 PROBLEM — N81.10 CYSTOCELE, UNSPECIFIED: Status: ACTIVE | Noted: 2024-04-21

## 2024-04-21 PROBLEM — Z01.419 ENCOUNTER FOR ANNUAL ROUTINE GYNECOLOGICAL EXAMINATION: Status: ACTIVE | Noted: 2024-04-21

## 2024-04-21 NOTE — ASSESSMENT & PLAN NOTE
Normal GYN and breast exams today, emphasized the importance of annual GYN examination.  Pap with HPV not performed today, done in 2022 at [facility]. Results were normal  STD testing performed: No  Advised yearly mammograms. Mammogram scheduled for July 18th.  Colon cancer screening with a colonoscopy is recommended starting at age 45 and reviewed benefits. Rx not given  Counseling on weight loss: Encourage 30-40 min weight bearing exercise most days of week  Encourage at least 1200 mg calcium citrate + 2000 IUs vitamin D3 divided through diet and supplement throughout the day  I reviewed the patient’s risk factors for osteoporosis and ordered a DEXA scan if applicable  All questions have been answered to her satisfaction

## 2024-04-21 NOTE — ASSESSMENT & PLAN NOTE
Patient is currently undergoing treatment for H. pylori, states her symptoms have much improved.  Sent test of cure to be performed 2 to 4 weeks after cessation of all H. pylori medications

## 2024-04-21 NOTE — ASSESSMENT & PLAN NOTE
Reports stress incontinence, dyspareunia, has history of 3 vaginal deliveries.  Discussed management options, patient would like to attempt conservative management at this time prior to pessaries or surgery.    Kegel exercises discussed, also sent referral for pelvic floor PT.

## 2024-05-02 ENCOUNTER — CONSULT (OUTPATIENT)
Dept: GASTROENTEROLOGY | Facility: CLINIC | Age: 44
End: 2024-05-02
Payer: COMMERCIAL

## 2024-05-02 VITALS
BODY MASS INDEX: 30.75 KG/M2 | DIASTOLIC BLOOD PRESSURE: 62 MMHG | WEIGHT: 184.6 LBS | SYSTOLIC BLOOD PRESSURE: 102 MMHG | HEART RATE: 61 BPM | HEIGHT: 65 IN

## 2024-05-02 DIAGNOSIS — K21.9 GASTROESOPHAGEAL REFLUX DISEASE WITHOUT ESOPHAGITIS: ICD-10-CM

## 2024-05-02 DIAGNOSIS — A04.8 H. PYLORI INFECTION: Primary | ICD-10-CM

## 2024-05-02 DIAGNOSIS — D50.9 IRON DEFICIENCY ANEMIA, UNSPECIFIED IRON DEFICIENCY ANEMIA TYPE: ICD-10-CM

## 2024-05-02 PROCEDURE — 99204 OFFICE O/P NEW MOD 45 MIN: CPT | Performed by: PHYSICIAN ASSISTANT

## 2024-05-02 RX ORDER — AMOXICILLIN 500 MG/1
500 CAPSULE ORAL EVERY 12 HOURS SCHEDULED
COMMUNITY

## 2024-05-02 RX ORDER — DEXLANSOPRAZOLE 60 MG/1
60 CAPSULE, DELAYED RELEASE ORAL DAILY
Qty: 30 CAPSULE | Refills: 2 | Status: SHIPPED | OUTPATIENT
Start: 2024-05-02

## 2024-05-02 NOTE — PROGRESS NOTES
[facility] - Outpatient Consultation  Sejal Venegas 43 y.o. female MRN: [id number]  Encounter: [id number]          ASSESSMENT AND PLAN:      1. Gastroesophageal reflux disease without esophagitis  - Ambulatory Referral to Gastroenterology  2.  H. pylori infection  Patient with history of H. pylori infection noted on stool antigen, still with significant reflux symptoms, will plan for EGD to evaluate for any reflux related complications and we can biopsy at that time for H. pylori which would be a few months after she completed the treatment, in the interim she can continue PPI and she states that the 40 mg of omeprazole was ineffective so I did order Dexilant 60 mg daily due to significant reflux, will of any peptic ulcer disease from the H. Pylori, patient could have dark stool from iron supplementation although she reports she has stopped this recently      3. Iron deficiency anemia, unspecified iron deficiency anemia type  Patient with iron deficiency anemia, hemoglobin most recently did not show any significant anemia, iron studies were ordered this year but she did have low ferritin last year, will plan colonoscopy to evaluate for any GI blood loss, patient is almost 44 years of age and would be due for screening for colon cancer in August of this year but will plan for further evaluation with colonoscopy due to the iron deficiency as well as dark stools if no findings on upper endoscopy  - Ambulatory Referral to Gastroenterology    ______________________________________________________________________    HPI:    43-year-old female who was referred here for GERD symptoms as well as iron deficiency anemia.  Patient was found to have positive H. pylori in stool.  Patient was treated with Prevpac equivalent.  Hemoglobin was 10.9 last year which was mildly decreased but it was 12.4 on most recent blood work. She presents today with complaints of continued reflux and she tells me that she  took omeprazole 40 mg prior to doing the 20 mg tells me that she completed treatment for H. pylori but had no improvement in the burning in her stomach and reflux symptoms.  She reports that she does see dark stools but she was taking iron but reports that she stopped this recently.  Most recent blood work did show stable hemoglobin as above.  Does have family history of H. pylori and acid related problems and GERD but no gastrointestinal malignancy.  She does have difficulty moving her bowels moves her bowels every 2 to 3 days.  She reports that she does get some discomfort in her lower abdomen but was told that she had a uterine prolapse.              REVIEW OF SYSTEMS:    CONSTITUTIONAL: Denies any fever, chills, rigors, and weight loss.  HEENT: No earache or tinnitus. Denies hearing loss or visual disturbances.  CARDIOVASCULAR: No chest pain or palpitations.   RESPIRATORY: Denies any cough, hemoptysis, shortness of breath or dyspnea on exertion.  GASTROINTESTINAL: As noted in the History of Present Illness.   GENITOURINARY: No problems with urination. Denies any hematuria or dysuria.  NEUROLOGIC: No dizziness or vertigo, denies headaches.   MUSCULOSKELETAL: Denies any muscle or joint pain.   SKIN: Denies skin rashes or itching.   ENDOCRINE: Denies excessive thirst. Denies intolerance to heat or cold.  PSYCHOSOCIAL: Denies depression or anxiety. Denies any recent memory loss.       Historical Information   Past Medical History:   Diagnosis Date    GERD (gastroesophageal reflux disease)     H. pylori infection      Past Surgical History:   Procedure Laterality Date    TONSILLECTOMY       Social History   Social History     Substance and Sexual Activity   Alcohol Use Not Currently     Social History     Substance and Sexual Activity   Drug Use Not on file     Social History     Tobacco Use   Smoking Status Never    Passive exposure: Never   Smokeless Tobacco Never     History reviewed. No pertinent family  history.    Meds/Allergies       Current Outpatient Medications:     amoxicillin (AMOXIL) 500 mg capsule    gabapentin (Neurontin) 100 mg capsule    omeprazole (PriLOSEC) 20 mg delayed release capsule    ascorbic acid (VITAMIN C) 500 MG tablet    Calcium-Vitamin D 500-3.125 MG-MCG TABS    ferrous sulfate 324 (65 Fe) mg    Multiple Vitamin (Multi-Vitamin Daily) TABS    No Known Allergies        Objective     Last menstrual period 03/10/2024. There is no height or weight on file to calculate BMI.        PHYSICAL EXAM:      General Appearance:   Alert, cooperative, no distress   HEENT:   Normocephalic, atraumatic, anicteric.     Neck:  Supple, symmetrical, trachea midline   Lungs:   Clear to auscultation bilaterally; no rales, rhonchi or wheezing; respirations unlabored    Heart::   Regular rate and rhythm; no murmur, rub, or gallop.   Abdomen:   Soft, non-tender, non-distended; normal bowel sounds; no masses, no organomegaly    Genitalia:   Deferred    Rectal:   Deferred    Extremities:  No cyanosis, clubbing or edema    Pulses:  2+ and symmetric    Skin:  No jaundice, rashes, or lesions    Lymph nodes:  No palpable cervical lymphadenopathy        Lab Results:   No visits with results within 1 Day(s) from this visit.   Latest known visit with results is:   Office Visit on 03/28/2024   Component Date Value    Specific Gravity 04/08/2024 1.027     Ph 04/08/2024 6.5     Color UA 04/08/2024 Yellow     Urine Appearance 04/08/2024 Clear     Leukocyte Esterase 04/08/2024 Negative     Protein 04/08/2024 Negative     Glucose, 24 HR Urine 04/08/2024 Negative     Ketone, Urine 04/08/2024 Negative     Blood, Urine 04/08/2024 Negative     Bilirubin, Urine 04/08/2024 Negative     Urobilinogen Urine 04/08/2024 0.2     Nitrites Urine 04/08/2024 Negative     Microscopic Examination 04/08/2024 Comment     White Blood Cell Count 04/08/2024 8.3     Red Blood Cell Count 04/08/2024 4.37     Hemoglobin 04/08/2024 12.4     HCT 04/08/2024  38.8     MCV 04/08/2024 89     MCH 04/08/2024 28.4     MCHC 04/08/2024 32.0     RDW 04/08/2024 13.5     Platelet Count 04/08/2024 229     Neutrophils 04/08/2024 62     Lymphocytes 04/08/2024 28     Monocytes 04/08/2024 8     Eosinophils 04/08/2024 2     Basophils PCT 04/08/2024 0     Neutrophils (Absolute) 04/08/2024 5.1     Lymphocytes (Absolute) 04/08/2024 2.3     Monocytes (Absolute) 04/08/2024 0.7     Eosinophils (Absolute) 04/08/2024 0.2     Basophils ABS 04/08/2024 0.0     Immature Granulocytes 04/08/2024 0     Immature Granulocytes (A* 04/08/2024 0.0     Glucose, Random 04/08/2024 85     BUN 04/08/2024 11     Creatinine 04/08/2024 0.56 (L)     eGFR 04/08/2024 115     SL AMB BUN/CREATININE RA* 04/08/2024 20     Sodium 04/08/2024 139     Potassium 04/08/2024 4.4     Chloride 04/08/2024 104     CO2 04/08/2024 24     CALCIUM 04/08/2024 9.0     Protein, Total 04/08/2024 6.5     Albumin 04/08/2024 4.0     Globulin, Total 04/08/2024 2.5     Albumin/Globulin Ratio 04/08/2024 1.6     TOTAL BILIRUBIN 04/08/2024 0.5     Alk Phos Isoenzymes 04/08/2024 94     AST 04/08/2024 19     ALT 04/08/2024 24     TSH 04/08/2024 1.770     Cholesterol, Total 04/08/2024 158     Triglycerides 04/08/2024 132     HDL 04/08/2024 47     VLDL Cholesterol Calcula* 04/08/2024 23     LDL Calculated 04/08/2024 88     T. Chol/HDL Ratio 04/08/2024 3.4     H pylori Ag, Stl 04/08/2024 Positive (A)          Radiology Results:   No results found.

## 2024-05-02 NOTE — PATIENT INSTRUCTIONS
Scheduled date of EGD/colonoscopy (as of today):07/23/24  Physician performing EGD/colonoscopy:Dr. Shetty  Location of EGD/colonoscopy:[facility]  Desired bowel prep reviewed with patient:Sissy  Instructions reviewed with patient by:bianca  Clearances:   n/a

## 2024-05-21 PROBLEM — Z01.419 ENCOUNTER FOR ANNUAL ROUTINE GYNECOLOGICAL EXAMINATION: Status: RESOLVED | Noted: 2024-04-21 | Resolved: 2024-05-21

## 2024-05-24 ENCOUNTER — TELEPHONE (OUTPATIENT)
Dept: GASTROENTEROLOGY | Facility: CLINIC | Age: 44
End: 2024-05-24

## 2024-05-24 NOTE — TELEPHONE ENCOUNTER
FOLLOW UP PG  5/30/2024 3:00 PM30 minutes  Chepe Roman PA-C in [facility]    Error: Block Conflict     FOLLOW UP PG should be scheduled in FOLLOW UP or Unblocked blocks, but Chepe Roman PA-C at 3:00 PM has New Patient. You do not have access to overrule this.      Pt was just seen in office for the exact same reasons/conditions as this appt.  This should be a follow up, but staff is blocked from correcting these.

## 2024-07-16 ENCOUNTER — OFFICE VISIT (OUTPATIENT)
Age: 44
End: 2024-07-16

## 2024-07-16 ENCOUNTER — TELEPHONE (OUTPATIENT)
Dept: GASTROENTEROLOGY | Facility: CLINIC | Age: 44
End: 2024-07-16

## 2024-07-16 ENCOUNTER — HOSPITAL ENCOUNTER (OUTPATIENT)
Dept: RADIOLOGY | Facility: HOSPITAL | Age: 44
Discharge: HOME | End: 2024-07-16
Payer: COMMERCIAL

## 2024-07-16 VITALS
RESPIRATION RATE: 18 BRPM | BODY MASS INDEX: 30.99 KG/M2 | HEART RATE: 67 BPM | DIASTOLIC BLOOD PRESSURE: 65 MMHG | HEIGHT: 65 IN | WEIGHT: 186 LBS | OXYGEN SATURATION: 97 % | SYSTOLIC BLOOD PRESSURE: 105 MMHG

## 2024-07-16 DIAGNOSIS — M25.521 RIGHT ELBOW PAIN: Primary | ICD-10-CM

## 2024-07-16 DIAGNOSIS — M25.561 ACUTE PAIN OF RIGHT KNEE: ICD-10-CM

## 2024-07-16 DIAGNOSIS — M25.521 RIGHT ELBOW PAIN: ICD-10-CM

## 2024-07-16 DIAGNOSIS — M67.442 GANGLION CYST OF FLEXOR TENDON SHEATH OF FINGER OF LEFT HAND: ICD-10-CM

## 2024-07-16 PROCEDURE — 73564 X-RAY EXAM KNEE 4 OR MORE: CPT

## 2024-07-16 PROCEDURE — 99213 OFFICE O/P EST LOW 20 MIN: CPT | Performed by: FAMILY MEDICINE

## 2024-07-16 PROCEDURE — 73080 X-RAY EXAM OF ELBOW: CPT

## 2024-07-16 NOTE — TELEPHONE ENCOUNTER
Aramis via .     lmom confirming pt's colonoscopy  and egd scheduled on 7/23/24 at [facility]  with Dr Shetty .  Informed [facility] would be calling this pt with the arrival time.  Informed of clear liquid diet day prior as well as the bowel cleansing preparation.  Informed would need a  the day of the procedure due to being under sedation. I asked pt to please call back if has not received instructions or if has any questions.

## 2024-07-16 NOTE — PROGRESS NOTES
[facility] Office visit    Assessment/Plan:     1. Right elbow pain  Assessment & Plan:  Patient states that she began having right elbow pain after she picked up bucket that she uses for work about 1 week ago. 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 No numbness or weakness.     Patient was exquisitely tender while palpating lateral right elbow.     Right elbow xray to r/o chip fracture   Voltaren gel PRN   Physical therapy referral placed      Orders:  -     XR elbow 3+ vw right; Future; Expected date: 07/16/2024  -     Diclofenac Sodium (VOLTAREN) 1 %; Apply 2 g topically 4 (four) times a day  -     Ambulatory Referral to Physical Therapy; Future  2. 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 Her right knee has been particular painful recently. No edema noted on physical aspect but pain elicited on lateral end of right knee upon palpation.     Sent for right knee xray  Voltarel gel PRN   Physical therapy referral placed  Orders:  -     XR knee 4+ vw right injury; Future; Expected date: 07/16/2024  -     Diclofenac Sodium (VOLTAREN) 1 %; Apply 2 g topically 4 (four) times a day  -     Ambulatory Referral to Physical Therapy; Future  3. Ganglion cyst of flexor tendon sheath of finger of left hand  Assessment & Plan:  Ganglion cyst noted of left hand where patient experiences pain.     Sent for ultrasound of left hand.   Orders:  -     US MSK complete; Future; Expected date: 07/16/2024  -     Ambulatory Referral to Physical Therapy; Future        No follow-ups on file.     Subjective:   JAMES Venegas is a 43 y.o. female here for multiple joint pains of right elbow, right knee and left hand. Denies numbness, weakness or any other complaints at this time.      Review of Systems   Constitutional:  Negative for chills and fever.   HENT:  Negative for ear pain and sore throat.    Eyes:  Negative for pain and  "visual disturbance.   Respiratory:  Negative for cough and shortness of breath.    Cardiovascular:  Negative for chest pain and palpitations.   Gastrointestinal:  Negative for abdominal pain and vomiting.   Genitourinary:  Negative for dysuria and hematuria.   Musculoskeletal:  Negative for arthralgias and back pain.        Right elbow pain, right knee pain, pain in left hand   Skin:  Negative for color change and rash.   Neurological:  Negative for seizures and syncope.   All other systems reviewed and are negative.       Objective:     /65 (BP Location: Left arm, Patient Position: Sitting)   Pulse 67   Resp 18   Ht 5' 5\" (1.651 m)   Wt 84.4 kg (186 lb)   SpO2 97%   BMI 30.95 kg/m²      Physical Exam  Constitutional:       Appearance: Normal appearance.   HENT:      Head: Atraumatic.   Eyes:      General:         Right eye: No discharge.         Left eye: No discharge.      Conjunctiva/sclera: Conjunctivae normal.   Cardiovascular:      Rate and Rhythm: Normal rate and regular rhythm.   Pulmonary:      Effort: Pulmonary effort is normal. No respiratory distress.      Breath sounds: Normal breath sounds.   Abdominal:      General: Bowel sounds are normal.      Palpations: Abdomen is soft.      Tenderness: There is no abdominal tenderness.   Musculoskeletal:         General: Tenderness (exquisitely tender while palpating right elbox) present. No swelling, deformity or signs of injury. Normal range of motion.      Cervical back: Neck supple.      Right knee: Tenderness present over the lateral joint line.      Left knee: Normal.   Skin:     General: Skin is warm and dry.      Capillary Refill: Capillary refill takes less than 2 seconds.      Comments: Ganglion cyst of left 3rd digit   Neurological:      Mental Status: She is alert.          ** Please Note: This note has been constructed using a voice recognition system **     Alexis Cruz MD  07/18/24  11:58 AM    "

## 2024-07-18 ENCOUNTER — ANESTHESIA EVENT (OUTPATIENT)
Dept: ANESTHESIOLOGY | Facility: HOSPITAL | Age: 44
End: 2024-07-18

## 2024-07-18 ENCOUNTER — HOSPITAL ENCOUNTER (OUTPATIENT)
Dept: RADIOLOGY | Facility: HOSPITAL | Age: 44
Discharge: HOME | End: 2024-07-18
Payer: COMMERCIAL

## 2024-07-18 ENCOUNTER — ANESTHESIA (OUTPATIENT)
Dept: ANESTHESIOLOGY | Facility: HOSPITAL | Age: 44
End: 2024-07-18

## 2024-07-18 DIAGNOSIS — Z12.31 ENCOUNTER FOR SCREENING MAMMOGRAM FOR BREAST CANCER: ICD-10-CM

## 2024-07-18 PROBLEM — M67.442 GANGLION CYST OF FLEXOR TENDON SHEATH OF FINGER OF LEFT HAND: Status: ACTIVE | Noted: 2024-07-18

## 2024-07-18 PROBLEM — M25.521 RIGHT ELBOW PAIN: Status: ACTIVE | Noted: 2024-07-18

## 2024-07-18 PROBLEM — M25.561 ACUTE PAIN OF RIGHT KNEE: Status: ACTIVE | Noted: 2024-07-18

## 2024-07-18 PROCEDURE — 77067 SCR MAMMO BI INCL CAD: CPT

## 2024-07-18 PROCEDURE — 77063 BREAST TOMOSYNTHESIS BI: CPT

## 2024-07-18 NOTE — ASSESSMENT & PLAN NOTE
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 Her right knee has been particular painful recently. No edema noted on physical aspect but pain elicited on lateral end of right knee upon palpation.     Sent for right knee xray  Voltarel gel PRN   Physical therapy referral placed

## 2024-07-18 NOTE — ASSESSMENT & PLAN NOTE
Patient states that she began having right elbow pain after she picked up bucket that she uses for work about 1 week ago. 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 No numbness or weakness.     Patient was exquisitely tender while palpating lateral right elbow.     Right elbow xray to r/o chip fracture   Voltaren gel PRN   Physical therapy referral placed

## 2024-07-18 NOTE — ASSESSMENT & PLAN NOTE
Ganglion cyst noted of left hand where patient experiences pain.     Sent for ultrasound of left hand.

## 2024-07-22 RX ORDER — SODIUM CHLORIDE, SODIUM LACTATE, POTASSIUM CHLORIDE, CALCIUM CHLORIDE 600; 310; 30; 20 MG/100ML; MG/100ML; MG/100ML; MG/100ML
75 INJECTION, SOLUTION INTRAVENOUS CONTINUOUS
Status: CANCELLED | OUTPATIENT
Start: 2024-07-22

## 2024-07-23 ENCOUNTER — HOSPITAL ENCOUNTER (OUTPATIENT)
Dept: GASTROENTEROLOGY | Facility: AMBULARY SURGERY CENTER | Age: 44
Setting detail: OUTPATIENT SURGERY
Discharge: HOME | End: 2024-07-23
Attending: INTERNAL MEDICINE
Payer: COMMERCIAL

## 2024-07-23 ENCOUNTER — ANESTHESIA (OUTPATIENT)
Dept: GASTROENTEROLOGY | Facility: AMBULARY SURGERY CENTER | Age: 44
End: 2024-07-23

## 2024-07-23 ENCOUNTER — ANESTHESIA EVENT (OUTPATIENT)
Dept: GASTROENTEROLOGY | Facility: AMBULARY SURGERY CENTER | Age: 44
End: 2024-07-23

## 2024-07-23 VITALS
DIASTOLIC BLOOD PRESSURE: 61 MMHG | BODY MASS INDEX: 30.99 KG/M2 | TEMPERATURE: 97.2 F | OXYGEN SATURATION: 100 % | RESPIRATION RATE: 18 BRPM | SYSTOLIC BLOOD PRESSURE: 108 MMHG | HEART RATE: 60 BPM | HEIGHT: 65 IN | WEIGHT: 186 LBS

## 2024-07-23 DIAGNOSIS — A04.8 H. PYLORI INFECTION: ICD-10-CM

## 2024-07-23 DIAGNOSIS — D50.9 IRON DEFICIENCY ANEMIA, UNSPECIFIED IRON DEFICIENCY ANEMIA TYPE: ICD-10-CM

## 2024-07-23 DIAGNOSIS — K21.9 GASTROESOPHAGEAL REFLUX DISEASE WITHOUT ESOPHAGITIS: ICD-10-CM

## 2024-07-23 LAB
EXT PREGNANCY TEST URINE: NEGATIVE
EXT. CONTROL: NORMAL

## 2024-07-23 PROCEDURE — 45378 DIAGNOSTIC COLONOSCOPY: CPT | Performed by: INTERNAL MEDICINE

## 2024-07-23 PROCEDURE — 88342 IMHCHEM/IMCYTCHM 1ST ANTB: CPT | Performed by: PATHOLOGY

## 2024-07-23 PROCEDURE — 81025 URINE PREGNANCY TEST: CPT | Performed by: ANESTHESIOLOGY

## 2024-07-23 PROCEDURE — 88305 TISSUE EXAM BY PATHOLOGIST: CPT | Performed by: PATHOLOGY

## 2024-07-23 PROCEDURE — 43239 EGD BIOPSY SINGLE/MULTIPLE: CPT | Performed by: INTERNAL MEDICINE

## 2024-07-23 RX ORDER — METOCLOPRAMIDE HYDROCHLORIDE 5 MG/ML
10 INJECTION INTRAMUSCULAR; INTRAVENOUS ONCE
Status: COMPLETED | OUTPATIENT
Start: 2024-07-23 | End: 2024-07-23

## 2024-07-23 RX ORDER — SODIUM CHLORIDE, SODIUM LACTATE, POTASSIUM CHLORIDE, CALCIUM CHLORIDE 600; 310; 30; 20 MG/100ML; MG/100ML; MG/100ML; MG/100ML
75 INJECTION, SOLUTION INTRAVENOUS CONTINUOUS
Status: DISCONTINUED | OUTPATIENT
Start: 2024-07-23 | End: 2024-07-27 | Stop reason: HOSPADM

## 2024-07-23 RX ORDER — LIDOCAINE HYDROCHLORIDE 10 MG/ML
INJECTION, SOLUTION EPIDURAL; INFILTRATION; INTRACAUDAL; PERINEURAL AS NEEDED
Status: DISCONTINUED | OUTPATIENT
Start: 2024-07-23 | End: 2024-07-23

## 2024-07-23 RX ORDER — PROPOFOL 10 MG/ML
INJECTION, EMULSION INTRAVENOUS AS NEEDED
Status: DISCONTINUED | OUTPATIENT
Start: 2024-07-23 | End: 2024-07-23

## 2024-07-23 RX ADMIN — PROPOFOL 120 MCG/KG/MIN: 10 INJECTION, EMULSION INTRAVENOUS at 15:23

## 2024-07-23 RX ADMIN — PROPOFOL 130 MG: 10 INJECTION, EMULSION INTRAVENOUS at 15:22

## 2024-07-23 RX ADMIN — LIDOCAINE HYDROCHLORIDE 50 MG: 10 INJECTION, SOLUTION EPIDURAL; INFILTRATION; INTRACAUDAL; PERINEURAL at 15:22

## 2024-07-23 RX ADMIN — PROPOFOL 50 MG: 10 INJECTION, EMULSION INTRAVENOUS at 15:37

## 2024-07-23 RX ADMIN — SODIUM CHLORIDE, SODIUM LACTATE, POTASSIUM CHLORIDE, AND CALCIUM CHLORIDE 75 ML/HR: .6; .31; .03; .02 INJECTION, SOLUTION INTRAVENOUS at 13:51

## 2024-07-23 RX ADMIN — PROPOFOL 40 MG: 10 INJECTION, EMULSION INTRAVENOUS at 15:46

## 2024-07-23 RX ADMIN — METOCLOPRAMIDE 10 MG: 5 INJECTION, SOLUTION INTRAMUSCULAR; INTRAVENOUS at 14:42

## 2024-07-23 NOTE — H&P
"History and Physical -  [facility]ists  Sejal Venegas 43 y.o. female MRN: [id number]                  HPI: Sejal Venegas is a 43 y.o. year old female who presents for iron deficiency anemia      REVIEW OF SYSTEMS: Per the HPI, and otherwise unremarkable.    Historical Information   Past Medical History:   Diagnosis Date    GERD (gastroesophageal reflux disease)     H. pylori infection      Past Surgical History:   Procedure Laterality Date    TONSILLECTOMY       Social History   Social History     Substance and Sexual Activity   Alcohol Use Not Currently     Social History     Substance and Sexual Activity   Drug Use Not on file     Social History     Tobacco Use   Smoking Status Never    Passive exposure: Never   Smokeless Tobacco Never     Family History   Problem Relation Age of Onset    No Known Problems Mother     No Known Problems Father     Thyroid cancer Sister 36    No Known Problems Daughter     Breast cancer Maternal Grandmother 45    No Known Problems Maternal Grandfather     No Known Problems Paternal Grandmother     No Known Problems Paternal Grandfather     Breast cancer Maternal Aunt 63       Meds/Allergies       Current Outpatient Medications:     dexlansoprazole (Dexilant) 60 MG capsule    Diclofenac Sodium (VOLTAREN) 1 %    ascorbic acid (VITAMIN C) 500 MG tablet    Calcium-Vitamin D 500-3.125 MG-MCG TABS    ferrous sulfate 324 (65 Fe) mg    gabapentin (Neurontin) 100 mg capsule    Multiple Vitamin (Multi-Vitamin Daily) TABS    Current Facility-Administered Medications:     lactated ringers infusion, 75 mL/hr, Intravenous, Continuous    metoclopramide (REGLAN) injection 10 mg, 10 mg, Intravenous, Once    No Known Allergies    Objective     /56   Pulse 56   Temp (!) 97.2 °F (36.2 °C) (Temporal)   Resp 21   Ht 5' 5\" (1.651 m)   Wt 84.4 kg (186 lb)   LMP 07/16/2024   SpO2 98%   BMI 30.95 kg/m²       PHYSICAL EXAM    Gen: NAD  Head: NCAT  CV: RRR  CHEST: Clear  ABD: soft, " NT/ND  EXT: no edema      ASSESSMENT/PLAN:  This is a 43 y.o. year old female here for colonoscopy, EGD, and she is stable and optimized for her procedure.

## 2024-07-23 NOTE — ANESTHESIA POSTPROCEDURE EVALUATION
Post-Op Assessment Note    CV Status:  Stable  Pain Score: 0    Pain management: adequate       Mental Status:  Sleepy and arousable   Hydration Status:  Euvolemic and stable   PONV Controlled:  Controlled   Airway Patency:  Patent     Post Op Vitals Reviewed: Yes    No anethesia notable event occurred.    Staff: CRNA               BP   91/54   Temp      Pulse  56   Resp   15   SpO2   98%

## 2024-07-23 NOTE — ANESTHESIA PREPROCEDURE EVALUATION
Procedure:  COLONOSCOPY  EGD    Relevant Problems   GI/HEPATIC   (+) Gastroesophageal reflux disease without esophagitis      HEMATOLOGY   (+) Iron deficiency anemia        Physical Exam    Airway    Mallampati score: II  TM Distance: >3 FB  Neck ROM: full     Dental       Cardiovascular  Rhythm: regular, Rate: normal    Pulmonary   Breath sounds clear to auscultation    Other Findings  post-pubertal.      Anesthesia Plan  ASA Score- 2     Anesthesia Type- IV sedation with anesthesia with ASA Monitors.         Additional Monitors:     Airway Plan:     Comment: Water at 1:20 pm ok for procedure at 3:20 pm.       Plan Factors-    Chart reviewed.        Patient is not a current smoker.              Induction- intravenous.    Postoperative Plan-     Perioperative Resuscitation Plan - Level 1 - Full Code.       Informed Consent- Anesthetic plan and risks discussed with patient.  I personally reviewed this patient with the CRNA. Discussed and agreed on the Anesthesia Plan with the CRNA..

## 2024-07-26 PROCEDURE — 88305 TISSUE EXAM BY PATHOLOGIST: CPT | Performed by: PATHOLOGY

## 2024-07-26 PROCEDURE — 88342 IMHCHEM/IMCYTCHM 1ST ANTB: CPT | Performed by: PATHOLOGY

## 2024-08-07 DIAGNOSIS — K21.9 GASTROESOPHAGEAL REFLUX DISEASE WITHOUT ESOPHAGITIS: ICD-10-CM

## 2024-08-07 RX ORDER — DEXLANSOPRAZOLE 60 MG/1
1 CAPSULE, DELAYED RELEASE ORAL DAILY
Qty: 90 CAPSULE | Refills: 0 | Status: SHIPPED | OUTPATIENT
Start: 2024-08-07

## 2024-08-15 ENCOUNTER — TELEPHONE (OUTPATIENT)
Dept: GASTROENTEROLOGY | Facility: AMBULARY SURGERY CENTER | Age: 44
End: 2024-08-15

## 2024-08-15 NOTE — TELEPHONE ENCOUNTER
Called pt to review results and schedule F/U visit, phone was breaking up could not hear pt clearly. Did not relay result information. Recall entered

## 2024-08-15 NOTE — TELEPHONE ENCOUNTER
----- Message from Cain Shetty MD sent at 8/12/2024  3:36 PM EDT -----  Please call the patient regarding his result.  Stomach and small bowel biopsies all benign.  No H. pylori, no celiac disease

## 2024-08-19 NOTE — TELEPHONE ENCOUNTER
2nd attempt to call pt to review results and schedule F/U visit, pt answered and phone call disconnected.

## 2024-08-21 ENCOUNTER — TELEPHONE (OUTPATIENT)
Dept: GASTROENTEROLOGY | Facility: CLINIC | Age: 44
End: 2024-08-21

## 2024-08-21 NOTE — TELEPHONE ENCOUNTER
----- Message from Ngozi PALACIOS sent at 8/20/2024  1:49 PM EDT -----  Left detailed message regarding results and recommendations.     Please reach out to pt to schedule OV. Thank You

## 2024-11-03 DIAGNOSIS — K21.9 GASTROESOPHAGEAL REFLUX DISEASE WITHOUT ESOPHAGITIS: ICD-10-CM

## 2024-11-05 RX ORDER — DEXLANSOPRAZOLE 60 MG/1
1 CAPSULE, DELAYED RELEASE ORAL DAILY
Qty: 90 CAPSULE | Refills: 1 | Status: SHIPPED | OUTPATIENT
Start: 2024-11-05